# Patient Record
Sex: MALE | Race: WHITE | NOT HISPANIC OR LATINO
[De-identification: names, ages, dates, MRNs, and addresses within clinical notes are randomized per-mention and may not be internally consistent; named-entity substitution may affect disease eponyms.]

---

## 2023-05-04 ENCOUNTER — APPOINTMENT (OUTPATIENT)
Dept: NEPHROLOGY | Facility: CLINIC | Age: 53
End: 2023-05-04
Payer: MEDICAID

## 2023-05-04 VITALS
HEART RATE: 66 BPM | HEIGHT: 70 IN | SYSTOLIC BLOOD PRESSURE: 152 MMHG | BODY MASS INDEX: 30.06 KG/M2 | DIASTOLIC BLOOD PRESSURE: 97 MMHG | WEIGHT: 210 LBS | OXYGEN SATURATION: 95 %

## 2023-05-04 DIAGNOSIS — N40.1 BENIGN PROSTATIC HYPERPLASIA WITH LOWER URINARY TRACT SYMPMS: ICD-10-CM

## 2023-05-04 PROCEDURE — 99204 OFFICE O/P NEW MOD 45 MIN: CPT

## 2023-05-04 RX ORDER — TAMSULOSIN HYDROCHLORIDE 0.4 MG/1
0.4 CAPSULE ORAL
Qty: 30 | Refills: 2 | Status: ACTIVE | COMMUNITY
Start: 2023-05-04 | End: 1900-01-01

## 2023-05-04 NOTE — ASSESSMENT
[FreeTextEntry1] : 53 year old male with HTN here for initial visit for discussion of labs and evaluation of renal function. \par \par -He reports b/l flank and back pain for years, no h/o trauma\par -pain is dull and non radiating, it comes and goes and is aggravated by movement\par -Denies weakness, numbness \par -No h/o nephrolithiasis, dysuria, gross hematuria\par -H/o chr NSAID +ve for bursitis shoulder\par -C/o LUTS\par \par Back/flank pain, chr:\par -UInclear etio, fibromyalgia, DDD, arthritis\par -Renal US reviewed WNL\par -SCR WNL\par -No proteinuria\par -In my opinion this chr pain is not originating from kidneys\par -Recommend f/u w/ ? MRI to explore alternate possibilities\par -Recommend physiotherapy\par -Will start on flomax for LUTS \par \par F/u w/ PCP.